# Patient Record
Sex: MALE
[De-identification: names, ages, dates, MRNs, and addresses within clinical notes are randomized per-mention and may not be internally consistent; named-entity substitution may affect disease eponyms.]

---

## 2018-10-18 ENCOUNTER — HOSPITAL ENCOUNTER (OUTPATIENT)
Dept: HOSPITAL 92 - RAD-FRANK | Age: 38
Discharge: HOME | End: 2018-10-18
Attending: NURSE PRACTITIONER
Payer: COMMERCIAL

## 2018-10-18 DIAGNOSIS — M19.012: ICD-10-CM

## 2018-10-18 DIAGNOSIS — S49.92XA: Primary | ICD-10-CM

## 2019-06-14 ENCOUNTER — HOSPITAL ENCOUNTER (OUTPATIENT)
Dept: GENERAL RADIOLOGY | Age: 39
Discharge: HOME | End: 2019-06-16
Payer: COMMERCIAL

## 2019-06-14 ENCOUNTER — HOSPITAL ENCOUNTER (OUTPATIENT)
Age: 39
Discharge: HOME | End: 2019-06-16
Payer: COMMERCIAL

## 2019-06-14 DIAGNOSIS — S93.402A SPRAIN OF LEFT ANKLE, UNSPECIFIED LIGAMENT, INITIAL ENCOUNTER: ICD-10-CM

## 2019-06-14 PROCEDURE — 73610 X-RAY EXAM OF ANKLE: CPT

## 2019-06-17 ENCOUNTER — HOSPITAL ENCOUNTER (OUTPATIENT)
Dept: PHYSICAL THERAPY | Age: 39
Setting detail: THERAPIES SERIES
Discharge: HOME | End: 2019-06-17
Payer: COMMERCIAL

## 2019-06-17 PROCEDURE — 97161 PT EVAL LOW COMPLEX 20 MIN: CPT

## 2019-06-17 ASSESSMENT — PAIN SCALES - GENERAL: PAINLEVEL_OUTOF10: 8

## 2019-06-17 NOTE — PROGRESS NOTES
Phone: [address] [place]         Fax: [phone]                      Outpatient Physical Therapy                                                                      Evaluation    Date: 2019  Patient: Anita Caldera  : 1980  ACCT #: [de-identified]    Referring Practitioner: Hazle Halsted, CNP    Referral Date : 19    Diagnosis: Sprain of unspecified Ligament of Left Ankle    Treatment Diagnosis: Gait ataxia due to L ankle pain. 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 Pt corrected himself fwd quickly and felt a tingling sensation. Pt reports that he did not note swelling until that evening. Pt reports pain is 8/10 with sudden movement. Pt reports currently sitting at work due to restriction.  (>8hour days) Pt reports under normal work conditions he stands. Pt reports normally repetative lifting of 50-60#(35 pallets). Pt reports if he turns in his sleep the pain will wake him. Pt reports pain is on the posterior aspect on the achilles tendon. Pt reports using crutches since injury. No bracing at this time, walking boot.   Pain Screening  Patient Currently in Pain: Yes  Pain Assessment  Pain Assessment: 0-10  Pain Level: 8          Objective           Strength LLE  L Hip ABduction: 3+/5  L Hip ADduction: 4+/5  L Knee Extension: 5/5  L Ankle Dorsiflexion: 5/5  L Ankle Plantar Flexion: 4/5  L Ankle Inversion: 4/5  L Ankle Eversion: 3+/5  AROM LLE (degrees)  L Ankle Dorsiflexion 0-20: 5deg       AROM LLE (degrees)  L Ankle Dorsiflexion 0-20: 5deg        PROM LLE (degrees)  L Ankle Dorsiflexion 0-20: 3deg  L Ankle Plantar Flexion 0-45: 30deg  L Ankle Forefoot Inversion 0-40: 40deg  L Ankle Forefoot Eversion 0-20: 15deg     Assessment  Body structures, Functions, Activity limitations: Decreased functional mobility , Decreased ROM, Decreased ADL status, Decreased strength, Decreased endurance, Decreased balance, Decreased high-level IADLs, Increased Pain  Assessment: Pt to benefit from ther ex for ROM and strengthening of L ankle. Pt to also benefit from balance and gait training to restore pt PLOF and ensure successful return to work. Manual therapy to be utilized to improve joint and tissue mobility. Prognosis: Good  Decision Making: Low Complexity  Exam: LEFS=54/80    Clinical Presentation:  Stable/Uncomplicated  The Following Comorbities will impact the patients progression and Plan of Care:   None       Activity Tolerance: Patient Tolerated treatment well    Education:   Patient Education: POC; Towel Stretch, Amb with sinlge crutch in R UE  Barriers to Learning: None    Goals  Short term goals  Time Frame for Short term goals: 6 visits  Short term goal 1: Pt to report independence and compliance with HEP. Short term goal 2: Pt to have PROM DF =10deg to improve pt standing and walking odessa. Long term goals  Time Frame for Long term goals : 12 visits  Long term goal 1: Pt to score >64/80 on LEFS to improve ADL odessa. Long term goal 2: Pt maintain SLS 10sec 2:3 trials to improve pt safety on stairs and with work duties. Long term goal 3: Pt to have 4/5 Eversion strength with MMT to improve pt ankle stability  Long term goal 4: Pt to amb 100ft without deviation to improve walking odessa. Long term goal 5: Pt to reports standing odessa. >2hours to prepare for return to work.     Patient's Goal:  Be able to return to work    Timed Code Treatment Minutes: 0 Minutes  Total Treatment Time: 45  Time In: 1000  Time Out: Abdiel Piper PT Date: 6/17/2019

## 2019-06-17 NOTE — PLAN OF CARE
[facility] [address]       Phone: [phone]   Date: 2019                      Outpatient Physical Therapy  Fax: [phone]    ACCT #: [de-identified]                     Plan of Care  [facility]#: [id number]  Patient: Dahlia Hernández  : 1980    Referring Practitioner: Zuri De Anda CNP    Referral Date : 19    Diagnosis: Sprain of unspecified Ligament of Left Ankle  Onset Date: 19  Treatment Diagnosis: Gait ataxia due to L ankle pain. Assessment  Body structures, Functions, Activity limitations: Decreased functional mobility , Decreased ROM, Decreased ADL status, Decreased strength, Decreased endurance, Decreased balance, Decreased high-level IADLs, Increased Pain  Assessment: Pt to benefit from ther ex for ROM and strengthening of L ankle. Pt to also benefit from balance and gait training to restore pt PLOF and ensure successful return to work. Manual therapy to be utilized to improve joint and tissue mobility. Prognosis: Good    Treatment Plan   Days: 3 times per week Weeks: 4 weeks Total # of Visits Approved: 12    HP/CP, Electrical Stimulation, Therapeutic Exercise, Gait Training, Patient Education/HEP, Manual Therapy: Myofacial Release, Manual Therapy: Dry Needling and Manual Therapy: Mobilization/Manipulation     Goals  Short term goals  Time Frame for Short term goals: 6 visits  Short term goal 1: Pt to report independence and compliance with HEP. Short term goal 2: Pt to have PROM DF =10deg to improve pt standing and walking odessa. Long term goals  Time Frame for Long term goals : 12 visits  Long term goal 1: Pt to score >64/80 on LEFS to improve ADL odessa. Long term goal 2: Pt maintain SLS 10sec 2:3 trials to improve pt safety on stairs and with work duties. Long term goal 3: Pt to have 4/5 Eversion strength with MMT to improve pt ankle stability  Long term goal 4: Pt to amb 100ft without deviation to improve walking odessa.   Long term goal 5: Pt to reports standing odessa. >2hours to prepare for return to work. Eddie Wong, PT   Date: 6/17/2019    ______________________________________ Date: 6/17/2019  Physician Signature  By signing above or cosigning electronically, I have reviewed this [address] [place] [address] and certify a need for medically necessary rehabilitation services.

## 2019-06-19 ENCOUNTER — HOSPITAL ENCOUNTER (OUTPATIENT)
Dept: PHYSICAL THERAPY | Age: 39
Setting detail: THERAPIES SERIES
Discharge: HOME | End: 2019-06-19
Payer: COMMERCIAL

## 2019-06-19 PROCEDURE — 97140 MANUAL THERAPY 1/> REGIONS: CPT

## 2019-06-19 PROCEDURE — 97110 THERAPEUTIC EXERCISES: CPT

## 2019-06-19 ASSESSMENT — PAIN SCALES - GENERAL: PAINLEVEL_OUTOF10: 3

## 2019-06-19 NOTE — PROGRESS NOTES
Phone: [address]      Fax: [phone]                            Outpatient Physical Therapy                                                                            Daily Note    Date: 2019  Patient Name: Lady Richards        MRN: [id number]   ACCT#:  [de-identified]  : 1980  (43 y.o.)    Referring Practitioner: Travis Multani CNP    Referral Date : 19    Diagnosis: Sprain of unspecified Ligament of Left Ankle  Treatment Diagnosis: Gait ataxia due to L ankle pain. Onset Date: 19  PT Insurance Information: [facility] 12v waiting on C9  Total # of Visits Approved: 12 Per Physician Order  Total # of Visits to Date: 2  No Show: 0  Canceled Appointment: 0    Pre-Treatment Pain:  3/10  Subjective: Pt sees Stephon Riley Friday  Assessment  Assessment: Intiated ex/manual therapy as outlined. Pt notes overall feeling better  with pain decreased to 3/10  before ex, 5/10 ater ex. Ankle DF improved to 7 deg DF. Will cont per plan and progress as odessa. Chart Reviewed: Yes    Plan  Plan: Continue with current plan    Exercises/Modalities/Manual:  See DocFlow Sheet    Education:   Patient Education: Reviewed towel stretch  Barriers to Learning: None    Goals  (Total # of Visits to Date: 2)   Short Term Goals - Time Frame for Short term goals: 6 visits  Short term goal 1: Pt to report independence and compliance with HEP. Short term goal 2: Pt to have PROM DF =10deg to improve pt standing and walking odessa. Long Term Goals - Time Frame for Long term goals : 12 visits  Long term goal 1: Pt to score >64/80 on LEFS to improve ADL odessa. Long term goal 2: Pt maintain SLS 10sec 2:3 trials to improve pt safety on stairs and with work duties. Long term goal 3: Pt to have 4/5 Eversion strength with MMT to improve pt ankle stability  Long term goal 4: Pt to amb 100ft without deviation to improve walking odessa.   Long term goal 5: Pt to reports standing odessa. >2hours to prepare for return to work.     Post Treatment Pain:  5/10    Time In: 0945    Time Out : 1015        Timed Code Treatment Minutes: 30 Minutes  Total Treatment Time: 30 Minutes    Tiburcio Roger  PTA   Date: 6/19/2019

## 2019-06-20 ENCOUNTER — HOSPITAL ENCOUNTER (OUTPATIENT)
Dept: PHYSICAL THERAPY | Age: 39
Setting detail: THERAPIES SERIES
Discharge: HOME | End: 2019-06-20
Payer: COMMERCIAL

## 2019-06-20 PROCEDURE — 97140 MANUAL THERAPY 1/> REGIONS: CPT

## 2019-06-20 PROCEDURE — 97110 THERAPEUTIC EXERCISES: CPT

## 2019-06-20 ASSESSMENT — PAIN SCALES - GENERAL: PAINLEVEL_OUTOF10: 2

## 2019-06-20 NOTE — PROGRESS NOTES
Phone: [address]      Fax: [phone]                            Outpatient Physical Therapy                                                                            Daily Note    Date: 2019  Patient Name: Hamilton Grimm        MRN: [id number]   ACCT#:  [de-identified]  : 1980  (43 y.o.)    Referring Practitioner: Cristy Ormond, CNP    Referral Date : 19    Diagnosis: Sprain of unspecified Ligament of Left Ankle  Treatment Diagnosis: Gait ataxia due to L ankle pain. Onset Date: 19  PT Insurance Information: [facility] 12v waiting on C9  Total # of Visits Approved: 12 Per Physician Order  Total # of Visits to Date: 3  No Show: 0  Canceled Appointment: 0    Pre-Treatment Pain:  1-2/10  Subjective: Pt sees Yvette Krishnamurthy Friday  Assessment  Assessment: Pt reports a few hours of soreness and tingling after last session . Today pain 1-2/10. PROM improved to 12 deg DF. Pt amb carrying crutch with symmetrical gait. Will cont to progess. Chart Reviewed: Yes    Plan  Plan: Continue with current plan    Exercises/Modalities/Manual:  See DocFlow Sheet    Goals  (Total # of Visits to Date: 3)   Short Term Goals - Time Frame for Short term goals: 6 visits  Short term goal 1: Pt to report independence and compliance with HEP. Short term goal 2: Pt to have PROM DF =10deg to improve pt standing and walking odessa. Long Term Goals - Time Frame for Long term goals : 12 visits  Long term goal 1: Pt to score >64/80 on LEFS to improve ADL odessa. Long term goal 2: Pt maintain SLS 10sec 2:3 trials to improve pt safety on stairs and with work duties. Long term goal 3: Pt to have 4/5 Eversion strength with MMT to improve pt ankle stability  Long term goal 4: Pt to amb 100ft without deviation to improve walking odessa. Long term goal 5: Pt to reports standing odessa. >2hours to prepare for return to work.     Post Treatment Pain:  0/10    Time In: 0900    Time Out : 0930        Timed Code Treatment Minutes: 30 Minutes  Total Treatment Time: 30 Minutes    Saba Roger  PTA   Date: 6/20/2019

## 2019-06-24 ENCOUNTER — HOSPITAL ENCOUNTER (OUTPATIENT)
Dept: PHYSICAL THERAPY | Age: 39
Setting detail: THERAPIES SERIES
Discharge: HOME | End: 2019-06-24
Payer: COMMERCIAL

## 2019-06-24 PROCEDURE — 97110 THERAPEUTIC EXERCISES: CPT

## 2019-06-24 NOTE — PROGRESS NOTES
Phone: [address]           Fax: [phone]                            Outpatient Physical Therapy                                                                            Daily Note    Date: 2019  Patient Name: Kylee Oneil        MRN: [id number]   ACCT#:  [de-identified]  : 1980  (43 y.o.)    Referring Practitioner: Justyna Carson CNP    Referral Date : 19    Diagnosis: Sprain of unspecified Ligament of Left Ankle  Treatment Diagnosis: Gait ataxia due to L ankle pain. Onset Date: 19  PT Insurance Information: [facility] 12v waiting on C9  Total # of Visits Approved: 12 Per Physician Order  Total # of Visits to Date: 4  No Show: 0  Canceled Appointment: 0    Pre-Treatment Pain:  0/10  Subjective: Pt sees Kristen Ford Friday  Assessment  Assessment: Pt reports no pain, 0/10 this morning. Reports he has been doing very well and feels much better. Main pain increase is directly following therapy after working the muscles. Continue per flowsheet. Added 4 way ankle Tband and 16# crate lift today. Tolerated well. Cues for mechanics on crate lift. Plan  Plan: Continue with current plan    Exercises/Modalities/Manual:  See DocFlow Sheet    Education:   Patient Education: Reviewed towel stretch  Barriers to Learning: None    Goals  (Total # of Visits to Date: 4)   Short Term Goals - Time Frame for Short term goals: 6 visits  Short term goal 1: Pt to report independence and compliance with HEP. Short term goal 2: Pt to have PROM DF =10deg to improve pt standing and walking odessa. Long Term Goals - Time Frame for Long term goals : 12 visits  Long term goal 1: Pt to score >64/80 on LEFS to improve ADL odessa. Long term goal 2: Pt maintain SLS 10sec 2:3 trials to improve pt safety on stairs and with work duties.   Long term goal 3: Pt to have 4/5 Eversion strength with MMT to improve pt ankle stability  Long term goal 4: Pt to amb 100ft without deviation to improve walking odessa. Long term goal 5: Pt to reports standing odessa. >2hours to prepare for return to work. Post Treatment Pain:  0/10    Time In: 1008    Time Out : 1043        Timed Code Treatment Minutes: 35 Minutes  Total Treatment Time: 35 Minutes    Bryson Wesley, JAIME /Directly Supervised byGosia Rudd, PT     Date: 6/24/2019

## 2019-06-26 ENCOUNTER — HOSPITAL ENCOUNTER (OUTPATIENT)
Dept: PHYSICAL THERAPY | Age: 39
Setting detail: THERAPIES SERIES
Discharge: HOME | End: 2019-06-26
Payer: COMMERCIAL

## 2019-06-26 PROCEDURE — 97110 THERAPEUTIC EXERCISES: CPT

## 2019-06-26 PROCEDURE — 97140 MANUAL THERAPY 1/> REGIONS: CPT

## 2019-06-26 NOTE — PROGRESS NOTES
Phone: [address]      Fax: [phone]                            Outpatient Physical Therapy                                                                            Daily Note    Date: 2019  Patient Name: Shayy Dominique        MRN: [id number]   ACCT#:  [de-identified]  : 1980  (43 y.o.)    Referring Practitioner: Pilar Winters CNP    Referral Date : 19    Diagnosis: Sprain of unspecified Ligament of Left Ankle  Treatment Diagnosis: Gait ataxia due to L ankle pain. Onset Date: 19  PT Insurance Information: [facility] 12v waiting on C9  Total # of Visits Approved: 12 Per Physician Order  Total # of Visits to Date: 5  No Show: 0  Canceled Appointment: 0    Pre-Treatment Pain:  0/10  Subjective: Next  appt 19  Assessment  Assessment: Pt continues to note no pain. In clinic pain was provoked by lifting crate from floor , subsided directly after. PROM to 15 deg DF. Pt follow up again with  next Monday with possible release to work. Chart Reviewed: Yes    Plan  Plan: Continue with current plan    Exercises/Modalities/Manual:  See DocFlow Sheet       Barriers to Learning: None    Goals  (Total # of Visits to Date: 5)   Short Term Goals - Time Frame for Short term goals: 6 visits  Short term goal 1: Pt to report independence and compliance with HEP. Short term goal 2: Pt to have PROM DF =10deg to improve pt standing and walking odessa.-met       Long Term Goals - Time Frame for Long term goals : 12 visits  Long term goal 1: Pt to score >64/80 on LEFS to improve ADL odessa. Long term goal 2: Pt maintain SLS 10sec 2:3 trials to improve pt safety on stairs and with work duties. Long term goal 3: Pt to have 4/5 Eversion strength with MMT to improve pt ankle stability  Long term goal 4: Pt to amb 100ft without deviation to improve walking odessa. Long term goal 5: Pt to reports standing odessa. >2hours to prepare for return to work.     Post Treatment Pain:

## 2019-06-27 ENCOUNTER — HOSPITAL ENCOUNTER (OUTPATIENT)
Dept: PHYSICAL THERAPY | Age: 39
Setting detail: THERAPIES SERIES
Discharge: HOME | End: 2019-06-27
Payer: COMMERCIAL

## 2019-06-27 PROCEDURE — 97110 THERAPEUTIC EXERCISES: CPT

## 2019-06-27 PROCEDURE — 97140 MANUAL THERAPY 1/> REGIONS: CPT

## 2019-06-27 NOTE — PROGRESS NOTES
Phone: [address]      Fax: [phone]                            Outpatient Physical Therapy                                                                            Daily Note    Date: 2019  Patient Name: Nasra Samson        MRN: [id number]   ACCT#:  [de-identified]  : 1980  (43 y.o.)    Referring Practitioner: Pauly Jerez CNP    Referral Date : 19    Diagnosis: Sprain of unspecified Ligament of Left Ankle  Treatment Diagnosis: Gait ataxia due to L ankle pain. Onset Date: 19  PT Insurance Information: [facility] 12v waiting on C9  Total # of Visits Approved: 12 Per Physician Order  Total # of Visits to Date: 6  No Show: 0  Canceled Appointment: 0    Pre-Treatment Pain:  0/10  Subjective: Next  appt 19  Assessment  Assessment: Pt remains painfree except when lifting crate from floor pain is 2/10. Eversion strength 5/5 with MMT. Will cont. Chart Reviewed: Yes    Plan  Plan: Continue with current plan    Exercises/Modalities/Manual:  See DocFlow Sheet    Education:      Barriers to Learning: None    Goals  (Total # of Visits to Date: 6)   Short Term Goals - Time Frame for Short term goals: 6 visits  Short term goal 1: Pt to report independence and compliance with HEP. Short term goal 2: Pt to have PROM DF =10deg to improve pt standing and walking odessa.-met     Long Term Goals - Time Frame for Long term goals : 12 visits  Long term goal 1: Pt to score >64/80 on LEFS to improve ADL odessa. Long term goal 2: Pt maintain SLS 10sec 2:3 trials to improve pt safety on stairs and with work duties. -met  Long term goal 3: Pt to have 4/5 Eversion strength with MMT to improve pt ankle stability-met  Long term goal 4: Pt to amb 100ft without deviation to improve walking odessa. Long term goal 5: Pt to reports standing odessa. >2hours to prepare for return to work.     Post Treatment Pain:  0/10    Time In: 0945    Time Out : 1020        Timed Code Treatment Minutes: 35 Minutes  Total Treatment Time: 35 Minutes    Singh Roger  PTA  Date: 6/27/2019

## 2019-07-01 ENCOUNTER — HOSPITAL ENCOUNTER (OUTPATIENT)
Dept: PHYSICAL THERAPY | Age: 39
Setting detail: THERAPIES SERIES
Discharge: HOME | End: 2019-07-01
Payer: COMMERCIAL

## 2019-07-01 PROCEDURE — 97140 MANUAL THERAPY 1/> REGIONS: CPT

## 2019-07-01 PROCEDURE — 97110 THERAPEUTIC EXERCISES: CPT

## 2019-07-03 ENCOUNTER — HOSPITAL ENCOUNTER (OUTPATIENT)
Dept: PHYSICAL THERAPY | Age: 39
Setting detail: THERAPIES SERIES
End: 2019-07-03
Payer: COMMERCIAL

## 2019-10-09 ENCOUNTER — HOSPITAL ENCOUNTER (EMERGENCY)
Age: 39
Discharge: HOME | End: 2019-10-09
Attending: EMERGENCY MEDICINE

## 2019-10-09 ENCOUNTER — APPOINTMENT (OUTPATIENT)
Dept: GENERAL RADIOLOGY | Age: 39
End: 2019-10-09

## 2019-10-09 VITALS
SYSTOLIC BLOOD PRESSURE: 116 MMHG | BODY MASS INDEX: 45.36 KG/M2 | RESPIRATION RATE: 21 BRPM | HEIGHT: 69 IN | HEART RATE: 115 BPM | TEMPERATURE: 98 F | OXYGEN SATURATION: 99 % | DIASTOLIC BLOOD PRESSURE: 64 MMHG | WEIGHT: 306.22 LBS

## 2019-10-09 DIAGNOSIS — J06.9 UPPER RESPIRATORY TRACT INFECTION, UNSPECIFIED TYPE: ICD-10-CM

## 2019-10-09 DIAGNOSIS — R42 DIZZINESS: Primary | ICD-10-CM

## 2019-10-09 DIAGNOSIS — E86.0 DEHYDRATION: ICD-10-CM

## 2019-10-09 LAB
ABSOLUTE BANDS #: 0.5 K/UL (ref 0–1)
ABSOLUTE EOS #: ABNORMAL K/UL (ref 0–0.4)
ABSOLUTE IMMATURE GRANULOCYTE: ABNORMAL K/UL (ref 0–0.3)
ABSOLUTE LYMPH #: 0.99 K/UL (ref 1–4.8)
ABSOLUTE MONO #: 0.83 K/UL (ref 0–1)
ALBUMIN SERPL-MCNC: 4.9 G/DL (ref 3.5–5.2)
ALBUMIN/GLOBULIN RATIO: ABNORMAL (ref 1–2.5)
ALP BLD-CCNC: 133 U/L (ref 40–129)
ALT SERPL-CCNC: 26 U/L (ref 5–41)
ANION GAP SERPL CALCULATED.3IONS-SCNC: 16 MMOL/L (ref 9–17)
AST SERPL-CCNC: 19 U/L
BANDS: 3 % (ref 0–10)
BASOPHILS # BLD: ABNORMAL % (ref 0–2)
BASOPHILS ABSOLUTE: ABNORMAL K/UL (ref 0–0.2)
BILIRUB SERPL-MCNC: 0.72 MG/DL (ref 0.3–1.2)
BUN BLDV-MCNC: 20 MG/DL (ref 6–20)
BUN/CREAT BLD: 21 (ref 9–20)
CALCIUM SERPL-MCNC: 10.3 MG/DL (ref 8.6–10.4)
CHLORIDE BLD-SCNC: 99 MMOL/L (ref 98–107)
CO2: 22 MMOL/L (ref 20–31)
CREAT SERPL-MCNC: 0.96 MG/DL (ref 0.7–1.2)
DIFFERENTIAL TYPE: ABNORMAL
DIRECT EXAM: NORMAL
DIRECT EXAM: NORMAL
EKG ATRIAL RATE: 140 BPM
EKG P AXIS: 72 DEGREES
EKG P-R INTERVAL: 142 MS
EKG Q-T INTERVAL: 276 MS
EKG QRS DURATION: 82 MS
EKG QTC CALCULATION (BAZETT): 421 MS
EKG R AXIS: 18 DEGREES
EKG T AXIS: 44 DEGREES
EKG VENTRICULAR RATE: 140 BPM
EOSINOPHILS RELATIVE PERCENT: ABNORMAL % (ref 0–5)
GFR AFRICAN AMERICAN: >60 ML/MIN
GFR NON-AFRICAN AMERICAN: >60 ML/MIN
GFR SERPL CREATININE-BSD FRML MDRD: ABNORMAL ML/MIN/{1.73_M2}
GFR SERPL CREATININE-BSD FRML MDRD: ABNORMAL ML/MIN/{1.73_M2}
GLUCOSE BLD-MCNC: 146 MG/DL (ref 70–99)
HCT VFR BLD CALC: 51 % (ref 41–53)
HEMOGLOBIN: 17 G/DL (ref 13.5–17.5)
IMMATURE GRANULOCYTES: ABNORMAL %
LACTIC ACID, WHOLE BLOOD: NORMAL MMOL/L (ref 0.7–2.1)
LACTIC ACID: 2.2 MMOL/L (ref 0.5–2.2)
LYMPHOCYTES # BLD: 6 % (ref 13–44)
Lab: NORMAL
MCH RBC QN AUTO: 29.6 PG (ref 26–34)
MCHC RBC AUTO-ENTMCNC: 33.3 G/DL (ref 31–37)
MCV RBC AUTO: 88.7 FL (ref 80–100)
MONOCYTES # BLD: 5 % (ref 5–9)
MORPHOLOGY: ABNORMAL
MORPHOLOGY: ABNORMAL
NRBC AUTOMATED: ABNORMAL PER 100 WBC
PDW BLD-RTO: 14.2 % (ref 12.1–15.2)
PLATELET # BLD: 285 K/UL (ref 140–450)
PLATELET ESTIMATE: ABNORMAL
PMV BLD AUTO: ABNORMAL FL (ref 6–12)
POTASSIUM SERPL-SCNC: 4.2 MMOL/L (ref 3.7–5.3)
RBC # BLD: 5.75 M/UL (ref 4.5–5.9)
RBC # BLD: ABNORMAL 10*6/UL
SEG NEUTROPHILS: 86 % (ref 39–75)
SEGMENTED NEUTROPHILS ABSOLUTE COUNT: 14.18 K/UL (ref 2.1–6.5)
SODIUM BLD-SCNC: 137 MMOL/L (ref 135–144)
SPECIMEN DESCRIPTION: NORMAL
TOTAL PROTEIN: 9.6 G/DL (ref 6.4–8.3)
WBC # BLD: 16.5 K/UL (ref 3.5–11)
WBC # BLD: ABNORMAL 10*3/UL

## 2019-10-09 PROCEDURE — 80053 COMPREHEN METABOLIC PANEL: CPT

## 2019-10-09 PROCEDURE — 85025 COMPLETE CBC W/AUTO DIFF WBC: CPT

## 2019-10-09 PROCEDURE — 93010 ELECTROCARDIOGRAM REPORT: CPT | Performed by: INTERNAL MEDICINE

## 2019-10-09 PROCEDURE — 99284 EMERGENCY DEPT VISIT MOD MDM: CPT

## 2019-10-09 PROCEDURE — 87040 BLOOD CULTURE FOR BACTERIA: CPT

## 2019-10-09 PROCEDURE — 2580000003 HC RX 258: Performed by: EMERGENCY MEDICINE

## 2019-10-09 PROCEDURE — 96361 HYDRATE IV INFUSION ADD-ON: CPT

## 2019-10-09 PROCEDURE — 6360000002 HC RX W HCPCS: Performed by: EMERGENCY MEDICINE

## 2019-10-09 PROCEDURE — 71046 X-RAY EXAM CHEST 2 VIEWS: CPT

## 2019-10-09 PROCEDURE — 87804 INFLUENZA ASSAY W/OPTIC: CPT

## 2019-10-09 PROCEDURE — 6370000000 HC RX 637 (ALT 250 FOR IP): Performed by: EMERGENCY MEDICINE

## 2019-10-09 PROCEDURE — 93005 ELECTROCARDIOGRAM TRACING: CPT | Performed by: EMERGENCY MEDICINE

## 2019-10-09 PROCEDURE — 83605 ASSAY OF LACTIC ACID: CPT

## 2019-10-09 PROCEDURE — 36415 COLL VENOUS BLD VENIPUNCTURE: CPT

## 2019-10-09 PROCEDURE — 96374 THER/PROPH/DIAG INJ IV PUSH: CPT

## 2019-10-09 RX ORDER — 0.9 % SODIUM CHLORIDE 0.9 %
1000 INTRAVENOUS SOLUTION INTRAVENOUS ONCE
Status: COMPLETED | OUTPATIENT
Start: 2019-10-09 | End: 2019-10-09

## 2019-10-09 RX ORDER — ACETAMINOPHEN 325 MG/1
650 TABLET ORAL ONCE
Status: COMPLETED | OUTPATIENT
Start: 2019-10-09 | End: 2019-10-09

## 2019-10-09 RX ADMIN — CEFTRIAXONE SODIUM 1 G: 1 INJECTION, POWDER, FOR SOLUTION INTRAMUSCULAR; INTRAVENOUS at 02:17

## 2019-10-09 RX ADMIN — ACETAMINOPHEN 650 MG: 325 TABLET, FILM COATED ORAL at 02:17

## 2019-10-09 RX ADMIN — SODIUM CHLORIDE 1000 ML: 9 INJECTION, SOLUTION INTRAVENOUS at 04:08

## 2019-10-09 RX ADMIN — SODIUM CHLORIDE 1000 ML: 9 INJECTION, SOLUTION INTRAVENOUS at 00:47

## 2019-10-09 ASSESSMENT — PAIN SCALES - GENERAL
PAINLEVEL_OUTOF10: 9
PAINLEVEL_OUTOF10: 5

## 2019-10-15 LAB
CULTURE: NORMAL
Lab: NORMAL
SPECIMEN DESCRIPTION: NORMAL

## 2023-10-23 ENCOUNTER — HOSPITAL ENCOUNTER (EMERGENCY)
Dept: HOSPITAL 92 - ERS | Age: 43
LOS: 1 days | Discharge: HOME | End: 2023-10-24
Payer: SELF-PAY

## 2023-10-23 DIAGNOSIS — M54.9: ICD-10-CM

## 2023-10-23 DIAGNOSIS — R55: Primary | ICD-10-CM

## 2023-10-23 LAB
ALBUMIN SERPL BCG-MCNC: 4.5 G/DL (ref 3.5–5)
ALP SERPL-CCNC: 111 U/L (ref 40–110)
ALT SERPL W P-5'-P-CCNC: 22 U/L (ref 8–55)
ANION GAP SERPL CALC-SCNC: 15 MMOL/L (ref 10–20)
APAP SERPL-MCNC: (no result) MCG/ML (ref 10–30)
APTT PPP: 27.1 SEC (ref 22.9–36.1)
AST SERPL-CCNC: 15 U/L (ref 5–34)
BASOPHILS # BLD AUTO: 0.1 THOU/UL (ref 0–0.2)
BASOPHILS NFR BLD AUTO: 0.5 % (ref 0–1)
BILIRUB SERPL-MCNC: 0.3 MG/DL (ref 0.2–1.2)
BUN SERPL-MCNC: 18 MG/DL (ref 8.9–20.6)
CALCIUM SERPL-MCNC: 9.6 MG/DL (ref 7.8–10.44)
CAUTI INDICATIONS FOR CULTURE: (no result)
CHLORIDE SERPL-SCNC: 106 MMOL/L (ref 98–107)
CO2 SERPL-SCNC: 19 MMOL/L (ref 22–29)
CREAT CL PREDICTED SERPL C-G-VRATE: 0 ML/MIN (ref 70–130)
D DIMER PPP FEU-MCNC: (no result) *MCG/ML (ref 0.27–0.43)
DRUG SCREEN CUTOFF: (no result)
EOSINOPHIL # BLD AUTO: 0.1 THOU/UL (ref 0–0.7)
EOSINOPHIL NFR BLD AUTO: 1.2 % (ref 0–10)
GLOBULIN SER CALC-MCNC: 3.2 G/DL (ref 2.4–3.5)
GLUCOSE SERPL-MCNC: 103 MG/DL (ref 70–105)
HCT VFR BLD CALC: 42.1 % (ref 42–52)
HGB BLD-MCNC: 13.8 G/DL (ref 14–18)
INR PPP: 1
LEUKOCYTE ESTERASE UR QL STRIP.AUTO: 25 LEU/UL
LIPASE SERPL-CCNC: 24 U/L (ref 8–78)
LYMPHOCYTES NFR BLD AUTO: 23.5 % (ref 21–51)
MCH RBC QN AUTO: 30.5 PG (ref 27–31)
MCV RBC AUTO: 93.1 FL (ref 78–98)
MONOCYTES # BLD AUTO: 0.6 THOU/UL (ref 0.11–0.59)
MONOCYTES NFR BLD AUTO: 5.2 % (ref 0–10)
NEUTROPHILS # BLD AUTO: 7.6 THOU/UL (ref 1.4–6.5)
NEUTROPHILS NFR BLD AUTO: 69.3 % (ref 42–75)
PLATELET # BLD AUTO: 317 10X3/UL (ref 130–400)
POTASSIUM SERPL-SCNC: 3.4 MMOL/L (ref 3.5–5.1)
PROTHROMBIN TIME: 13.8 SEC (ref 12–14.7)
RBC # BLD AUTO: 4.52 MILL/UL (ref 4.7–6.1)
RBC UR QL AUTO: (no result) HPF (ref 0–3)
SALICYLATES SERPL-MCNC: (no result) MG/DL (ref 15–30)
SODIUM SERPL-SCNC: 137 MMOL/L (ref 136–145)
SP GR UR STRIP: 1.02 (ref 1–1.04)
TROPONIN I SERPL DL<=0.01 NG/ML-MCNC: (no result) NG/ML (ref ?–0.03)
WBC # BLD AUTO: 11 10X3/UL (ref 4.8–10.8)
WBC UR QL AUTO: (no result) HPF (ref 0–3)

## 2023-10-23 PROCEDURE — 80306 DRUG TEST PRSMV INSTRMNT: CPT

## 2023-10-23 PROCEDURE — 85730 THROMBOPLASTIN TIME PARTIAL: CPT

## 2023-10-23 PROCEDURE — 36416 COLLJ CAPILLARY BLOOD SPEC: CPT

## 2023-10-23 PROCEDURE — 85025 COMPLETE CBC W/AUTO DIFF WBC: CPT

## 2023-10-23 PROCEDURE — 81001 URINALYSIS AUTO W/SCOPE: CPT

## 2023-10-23 PROCEDURE — 84443 ASSAY THYROID STIM HORMONE: CPT

## 2023-10-23 PROCEDURE — 80053 COMPREHEN METABOLIC PANEL: CPT

## 2023-10-23 PROCEDURE — 83880 ASSAY OF NATRIURETIC PEPTIDE: CPT

## 2023-10-23 PROCEDURE — 85379 FIBRIN DEGRADATION QUANT: CPT

## 2023-10-23 PROCEDURE — 84484 ASSAY OF TROPONIN QUANT: CPT

## 2023-10-23 PROCEDURE — 83690 ASSAY OF LIPASE: CPT

## 2023-10-23 PROCEDURE — 85610 PROTHROMBIN TIME: CPT

## 2023-10-23 PROCEDURE — 71275 CT ANGIOGRAPHY CHEST: CPT

## 2023-10-23 PROCEDURE — 93005 ELECTROCARDIOGRAM TRACING: CPT

## 2023-10-23 PROCEDURE — 71045 X-RAY EXAM CHEST 1 VIEW: CPT

## 2023-10-23 PROCEDURE — 80307 DRUG TEST PRSMV CHEM ANLYZR: CPT

## 2023-10-23 PROCEDURE — 74174 CTA ABD&PLVS W/CONTRAST: CPT

## 2023-10-23 PROCEDURE — 36415 COLL VENOUS BLD VENIPUNCTURE: CPT
